# Patient Record
Sex: MALE | Race: WHITE
[De-identification: names, ages, dates, MRNs, and addresses within clinical notes are randomized per-mention and may not be internally consistent; named-entity substitution may affect disease eponyms.]

---

## 2021-09-01 ENCOUNTER — NURSE TRIAGE (OUTPATIENT)
Dept: OTHER | Facility: CLINIC | Age: 51
End: 2021-09-01

## 2021-09-01 NOTE — TELEPHONE ENCOUNTER
Reason for Disposition   [1] Fever AND [2] no signs of serious infection or localizing symptoms (all other triage questions negative)    Answer Assessment - Initial Assessment Questions  1. TEMPERATURE: \"What is the most recent temperature? \"  \"How was it measured? \"       99.7  With oral    2. ONSET: \"When did the fever start? \"       8 days ago    3. SYMPTOMS: \"Do you have any other symptoms besides the fever? \"  (e.g., colds, headache, sore throat, earache, cough, rash, diarrhea, vomiting, abdominal pain)      Cough, fatigue, slight headache across forehead,  Ear pain, slight sore throat     4. CAUSE: If there are no symptoms, ask: \"What do you think is causing the fever? \"       N/a    5. CONTACTS: \"Does anyone else in the family have an infection? \"      My boss tested positive at the same time I did. 6. TREATMENT: \"What have you done so far to treat this fever? \" (e.g., medications)      Tylenol or advil    7. IMMUNOCOMPROMISE: \"Do you have of the following: diabetes, HIV positive, splenectomy, cancer chemotherapy, chronic steroid treatment, transplant patient, etc.\"      Denies except for  Being overweight    8. PREGNANCY: \"Is there any chance you are pregnant? \" \"When was your last menstrual period? \"      No    9. TRAVEL: \"Have you traveled out of the country in the last month? \" (e.g., travel history, exposures)      No    Protocols used: FEVER-ADULT-AH    Brief description of triage: intermittent low grade fever x 8 days,  Covid +, dry cough and fatigue    Triage indicates for patient to home care. Care advice provided, patient verbalizes understanding; denies any other questions or concerns; instructed to call back for any new or worsening symptoms. This triage is a result of a call to [address] [place] [address]. Please do not respond to the triage nurse through this encounter. Any subsequent communication should be directly with the patient.